# Patient Record
Sex: MALE | Race: OTHER | URBAN - METROPOLITAN AREA
[De-identification: names, ages, dates, MRNs, and addresses within clinical notes are randomized per-mention and may not be internally consistent; named-entity substitution may affect disease eponyms.]

---
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
## 2023-02-28 ENCOUNTER — HOSPITAL ENCOUNTER (EMERGENCY)
Age: 18
Discharge: HOME | End: 2023-02-28
Attending: EMERGENCY MEDICINE
Payer: COMMERCIAL

## 2023-02-28 ENCOUNTER — APPOINTMENT (OUTPATIENT)
Dept: CT IMAGING | Age: 18
End: 2023-02-28
Payer: COMMERCIAL

## 2023-02-28 VITALS
HEART RATE: 63 BPM | OXYGEN SATURATION: 100 % | TEMPERATURE: 98.2 F | BODY MASS INDEX: 25.84 KG/M2 | SYSTOLIC BLOOD PRESSURE: 150 MMHG | WEIGHT: 195 LBS | HEIGHT: 73 IN | RESPIRATION RATE: 18 BRPM | DIASTOLIC BLOOD PRESSURE: 91 MMHG

## 2023-02-28 DIAGNOSIS — D72.829 LEUKOCYTOSIS, UNSPECIFIED TYPE: ICD-10-CM

## 2023-02-28 DIAGNOSIS — R10.31 ABDOMINAL PAIN, RIGHT LOWER QUADRANT: Primary | ICD-10-CM

## 2023-02-28 LAB
A/G RATIO: 1.7 (ref 1.1–2.2)
ALBUMIN SERPL-MCNC: 4.4 G/DL (ref 3.4–5)
ALP BLD-CCNC: 41 U/L (ref 40–129)
ALT SERPL-CCNC: 9 U/L (ref 10–40)
ANION GAP SERPL CALCULATED.3IONS-SCNC: 11 MMOL/L (ref 3–16)
AST SERPL-CCNC: 12 U/L (ref 15–37)
BASOPHILS ABSOLUTE: 0.1 K/UL (ref 0–0.2)
BASOPHILS RELATIVE PERCENT: 0.7 %
BILIRUB SERPL-MCNC: 0.7 MG/DL (ref 0–1)
BUN BLDV-MCNC: 9 MG/DL (ref 7–20)
C-REACTIVE PROTEIN: <3 MG/L (ref 0–5.1)
CALCIUM SERPL-MCNC: 8.7 MG/DL (ref 8.3–10.6)
CHLORIDE BLD-SCNC: 98 MMOL/L (ref 99–110)
CO2: 25 MMOL/L (ref 21–32)
CREAT SERPL-MCNC: 0.7 MG/DL (ref 0.9–1.3)
EOSINOPHILS ABSOLUTE: 0.3 K/UL (ref 0–0.6)
EOSINOPHILS RELATIVE PERCENT: 1.8 %
GFR SERPL CREATININE-BSD FRML MDRD: >60 ML/MIN/{1.73_M2}
GLUCOSE BLD-MCNC: 96 MG/DL (ref 70–99)
HCT VFR BLD CALC: 44.4 % (ref 40.5–52.5)
HEMOGLOBIN: 15 G/DL (ref 13.5–17.5)
LYMPHOCYTES ABSOLUTE: 2.6 K/UL (ref 1–5.1)
LYMPHOCYTES RELATIVE PERCENT: 18 %
MCH RBC QN AUTO: 30.2 PG (ref 26–34)
MCHC RBC AUTO-ENTMCNC: 33.7 G/DL (ref 31–36)
MCV RBC AUTO: 89.7 FL (ref 80–100)
MONOCYTES ABSOLUTE: 1.1 K/UL (ref 0–1.3)
MONOCYTES RELATIVE PERCENT: 7.4 %
NEUTROPHILS ABSOLUTE: 10.3 K/UL (ref 1.7–7.7)
NEUTROPHILS RELATIVE PERCENT: 72.1 %
PDW BLD-RTO: 13.2 % (ref 12.4–15.4)
PLATELET # BLD: 317 K/UL (ref 135–450)
PMV BLD AUTO: 6.8 FL (ref 5–10.5)
POTASSIUM REFLEX MAGNESIUM: 3.9 MMOL/L (ref 3.5–5.1)
RBC # BLD: 4.95 M/UL (ref 4.2–5.9)
SODIUM BLD-SCNC: 134 MMOL/L (ref 136–145)
TOTAL PROTEIN: 7 G/DL (ref 6.4–8.2)
WBC # BLD: 14.3 K/UL (ref 4–11)

## 2023-02-28 PROCEDURE — 86140 C-REACTIVE PROTEIN: CPT

## 2023-02-28 PROCEDURE — 80053 COMPREHEN METABOLIC PANEL: CPT

## 2023-02-28 PROCEDURE — 96375 TX/PRO/DX INJ NEW DRUG ADDON: CPT

## 2023-02-28 PROCEDURE — 74177 CT ABD & PELVIS W/CONTRAST: CPT

## 2023-02-28 PROCEDURE — 6370000000 HC RX 637 (ALT 250 FOR IP): Performed by: EMERGENCY MEDICINE

## 2023-02-28 PROCEDURE — 96374 THER/PROPH/DIAG INJ IV PUSH: CPT

## 2023-02-28 PROCEDURE — 6360000004 HC RX CONTRAST MEDICATION: Performed by: EMERGENCY MEDICINE

## 2023-02-28 PROCEDURE — 6360000002 HC RX W HCPCS: Performed by: EMERGENCY MEDICINE

## 2023-02-28 PROCEDURE — 99285 EMERGENCY DEPT VISIT HI MDM: CPT

## 2023-02-28 PROCEDURE — 85025 COMPLETE CBC W/AUTO DIFF WBC: CPT

## 2023-02-28 RX ORDER — CIPROFLOXACIN 500 MG/1
500 TABLET, FILM COATED ORAL ONCE
Status: COMPLETED | OUTPATIENT
Start: 2023-02-28 | End: 2023-02-28

## 2023-02-28 RX ORDER — METRONIDAZOLE 250 MG/1
500 TABLET ORAL ONCE
Status: COMPLETED | OUTPATIENT
Start: 2023-02-28 | End: 2023-02-28

## 2023-02-28 RX ORDER — ONDANSETRON 4 MG/1
4 TABLET, FILM COATED ORAL EVERY 8 HOURS PRN
Qty: 9 TABLET | Refills: 0 | Status: SHIPPED | OUTPATIENT
Start: 2023-02-28 | End: 2023-03-03

## 2023-02-28 RX ORDER — HYDROCODONE BITARTRATE AND ACETAMINOPHEN 5; 325 MG/1; MG/1
1 TABLET ORAL ONCE
Status: COMPLETED | OUTPATIENT
Start: 2023-02-28 | End: 2023-02-28

## 2023-02-28 RX ORDER — ONDANSETRON 2 MG/ML
4 INJECTION INTRAMUSCULAR; INTRAVENOUS ONCE
Status: COMPLETED | OUTPATIENT
Start: 2023-02-28 | End: 2023-02-28

## 2023-02-28 RX ORDER — MORPHINE SULFATE 4 MG/ML
4 INJECTION, SOLUTION INTRAMUSCULAR; INTRAVENOUS ONCE
Status: COMPLETED | OUTPATIENT
Start: 2023-02-28 | End: 2023-02-28

## 2023-02-28 RX ORDER — METRONIDAZOLE 500 MG/1
500 TABLET ORAL 2 TIMES DAILY
Qty: 14 TABLET | Refills: 0 | Status: SHIPPED | OUTPATIENT
Start: 2023-02-28 | End: 2023-03-07

## 2023-02-28 RX ORDER — KETOROLAC TROMETHAMINE 30 MG/ML
30 INJECTION, SOLUTION INTRAMUSCULAR; INTRAVENOUS ONCE
Status: COMPLETED | OUTPATIENT
Start: 2023-02-28 | End: 2023-02-28

## 2023-02-28 RX ORDER — CIPROFLOXACIN 500 MG/1
500 TABLET, FILM COATED ORAL 2 TIMES DAILY
Qty: 14 TABLET | Refills: 0 | Status: SHIPPED | OUTPATIENT
Start: 2023-02-28 | End: 2023-03-07

## 2023-02-28 RX ORDER — HYDROCODONE BITARTRATE AND ACETAMINOPHEN 5; 325 MG/1; MG/1
1 TABLET ORAL EVERY 6 HOURS PRN
Qty: 10 TABLET | Refills: 0 | Status: SHIPPED | OUTPATIENT
Start: 2023-02-28 | End: 2023-03-03

## 2023-02-28 RX ADMIN — IOPAMIDOL 75 ML: 755 INJECTION, SOLUTION INTRAVENOUS at 04:07

## 2023-02-28 RX ADMIN — ONDANSETRON 4 MG: 2 INJECTION INTRAMUSCULAR; INTRAVENOUS at 04:41

## 2023-02-28 RX ADMIN — HYDROCODONE BITARTRATE AND ACETAMINOPHEN 1 TABLET: 5; 325 TABLET ORAL at 06:49

## 2023-02-28 RX ADMIN — MORPHINE SULFATE 4 MG: 4 INJECTION, SOLUTION INTRAMUSCULAR; INTRAVENOUS at 04:42

## 2023-02-28 RX ADMIN — CIPROFLOXACIN 500 MG: 500 TABLET, FILM COATED ORAL at 06:49

## 2023-02-28 RX ADMIN — METRONIDAZOLE 500 MG: 250 TABLET ORAL at 06:49

## 2023-02-28 RX ADMIN — KETOROLAC TROMETHAMINE 30 MG: 30 INJECTION, SOLUTION INTRAMUSCULAR; INTRAVENOUS at 03:09

## 2023-02-28 ASSESSMENT — PAIN SCALES - GENERAL
PAINLEVEL_OUTOF10: 4
PAINLEVEL_OUTOF10: 3
PAINLEVEL_OUTOF10: 9
PAINLEVEL_OUTOF10: 4
PAINLEVEL_OUTOF10: 7
PAINLEVEL_OUTOF10: 7

## 2023-02-28 ASSESSMENT — ENCOUNTER SYMPTOMS
VOMITING: 0
ABDOMINAL PAIN: 1
SHORTNESS OF BREATH: 0
COUGH: 0
BLOOD IN STOOL: 0
NAUSEA: 0

## 2023-02-28 ASSESSMENT — PAIN DESCRIPTION - ORIENTATION
ORIENTATION: RIGHT;UPPER
ORIENTATION: UPPER;RIGHT

## 2023-02-28 ASSESSMENT — PAIN DESCRIPTION - LOCATION
LOCATION: ABDOMEN
LOCATION: ABDOMEN

## 2023-02-28 ASSESSMENT — PAIN DESCRIPTION - DESCRIPTORS
DESCRIPTORS: ACHING;STABBING
DESCRIPTORS: ACHING;STABBING

## 2023-02-28 ASSESSMENT — PAIN - FUNCTIONAL ASSESSMENT: PAIN_FUNCTIONAL_ASSESSMENT: 0-10

## 2023-02-28 NOTE — Clinical Note
Sandeep Elias was seen and treated in our emergency department on 2/28/2023. He may return to work on 03/02/2023. If you have any questions or concerns, please don't hesitate to call.       [address] [facility], DO

## 2023-02-28 NOTE — ED NOTES
Paged Gen surg @ 0600  RE:ilius, RLQ abdominal pain Per Ang Mac DO  Second message sent to Dr. Rose Marie Maria @ [phone]  Dr. Rose Marie Maria responded @ [id number] and spoke with Dr. Fauzia Jj  02/28/23 5268

## 2023-02-28 NOTE — ED PROVIDER NOTES
Emergency Department Attending Physician Note  Location: [address] [place] [address]  ED  2/28/2023       Pt Name: Gerald Mendoza  MRN: [id number]  [place] 2005    Date of evaluation: 2/28/2023  Provider: Kerwin Koo DO  PCP: Temo Soto DO    Note Started: 3:12 AM EST 2/28/23    CHIEF COMPLAINT  Chief Complaint   Patient presents with    Abdominal Pain     Started at 2030, no emesis or loose stools        HISTORY OF PRESENT ILLNESS:  History obtained by patient. Limitations to history : None. Gerald Mendoza is a 21 y.o. male with no significant past medical history, no previous abdominal surgeries, presenting emerged department today with periumbilical abdominal pain that started yesterday, but now radiates down to her the right side, and is much more severe. Started around midnight last night, arrives emergency department 3 hours later, with significant pain. Patient is sitting in the ER with legs drawn up, and walking in the car ride made the pain much worse. Has not got anything for pain prior to arrival.  Had a good appetite yesterday, normal p.o. Had some chicken Parmesan for dinner. No nausea or vomiting. No recent diarrhea. Has never felt anything like this before. No back pain, but does somewhat intermittently feel the right lower quadrant pain in the right lower back. No injuries. Denies any difficulty ambulating. No other concerns including fevers. No dysuria or hematuria. Nursing Notes were all reviewed and agreed with or any disagreements were addressed in the HPI. MEDICAL HISTORY  No past medical history on file. SURGICAL HISTORY  No past surgical history on file. CURRENT MEDICATIONS  Previous Medications    No medications on file       ALLERGIES  Patient has no known allergies. FAMILYHISTORY  No family history on file.     SOCIAL HISTORY       SCREENINGS    [place] Coma Scale  Eye Opening: Spontaneous  Best Verbal Response: Oriented  Best Motor Response: Obeys commands  Jean Coma Scale Score: 15       CIWA Assessment  BP: (!) 150/91  Heart Rate: 63             REVIEW OF SYSTEMS:    Review of Systems   Constitutional:  Negative for activity change, appetite change and fever. HENT:  Negative for congestion and postnasal drip. Respiratory:  Negative for cough and shortness of breath. Cardiovascular:  Negative for chest pain and leg swelling. Gastrointestinal:  Positive for abdominal pain. Negative for blood in stool, nausea and vomiting. Genitourinary:  Negative for dysuria and hematuria. Skin:  Negative for rash and wound. Neurological:  Negative for dizziness and light-headedness. Positives and Pertinent negatives as per HPI. PHYSICAL EXAM:     Vitals:    02/28/23 0243 02/28/23 0433 02/28/23 0552 02/28/23 0644   BP: (!) 143/96 (!) 140/99 (!) 134/97 (!) 150/91   Pulse: 59 68 57 63   Resp: 16 18 14 18   Temp: 98.2 °F (36.8 °C)      TempSrc: Oral      SpO2: 100% 100% 98% 100%   Weight: 195 lb (88.5 kg)      Height: 6' 1\" (1.854 m)          Physical Exam  Constitutional:       Appearance: Normal appearance. He is normal weight. He is ill-appearing. He is not diaphoretic. Comments: Appears uncomfortable, however interactive, conversational, pleasant, and in no acute clinical cardiopulmonary distress. HENT:      Head: Normocephalic and atraumatic. Right Ear: External ear normal.      Left Ear: External ear normal.      Nose: Nose normal.      Mouth/Throat:      Mouth: Mucous membranes are moist.      Pharynx: Oropharynx is clear. Eyes:      General:         Right eye: No discharge. Left eye: No discharge. Conjunctiva/sclera: Conjunctivae normal.   Cardiovascular:      Rate and Rhythm: Normal rate and regular rhythm. Pulmonary:      Effort: Pulmonary effort is normal. No respiratory distress. Breath sounds: Normal breath sounds. Abdominal:      General: Abdomen is flat. Palpations: Abdomen is soft. Tenderness: There is abdominal tenderness (patient grabs for my hand with palpation of RLQ) in the right lower quadrant and periumbilical area. There is guarding. There is no rebound. Musculoskeletal:         General: No swelling or tenderness. Cervical back: Normal range of motion and neck supple. Skin:     General: Skin is warm and dry. Findings: No rash. Neurological:      General: No focal deficit present. Mental Status: He is alert and oriented to person, place, and time. Psychiatric:         Mood and Affect: Mood normal.         Thought Content: Thought content normal.       DIAGNOSTIC RESULTS:  LABS:    Labs Reviewed   CBC WITH AUTO DIFFERENTIAL - Abnormal; Notable for the following components:       Result Value    WBC 14.3 (*)     Neutrophils Absolute 10.3 (*)     All other components within normal limits   COMPREHENSIVE METABOLIC PANEL W/ REFLEX TO MG FOR LOW K - Abnormal; Notable for the following components:    Sodium 134 (*)     Chloride 98 (*)     Creatinine 0.7 (*)     ALT 9 (*)     AST 12 (*)     All other components within normal limits   C-REACTIVE PROTEIN   URINALYSIS WITH REFLEX TO CULTURE       When ordered, only abnormal lab results are displayed. All other labs were within normal range or not returned as of this dictation. RADIOLOGY:      Non-plain film images such as CT, Ultrasound and MRI are read by the radiologist. Interpretation per the Radiologist below, if available at the time of this note:  CT ABDOMEN PELVIS W IV CONTRAST Additional Contrast? None   Preliminary Result   1. Fecalization of the distal ileal small bowel contents. This could be   related to an infectious/inflammatory enteritis, ileus versus or early bowel   obstruction. 2. The appendix is normal.             PROCEDURES:  Unless otherwise noted below, none.     Procedures      MEDICATIONS:   Medications   ciprofloxacin (CIPRO) tablet 500 mg (has no administration in time range) metroNIDAZOLE (FLAGYL) tablet 500 mg (has no administration in time range)   HYDROcodone-acetaminophen (NORCO) 5-325 MG per tablet 1 tablet (has no administration in time range)   ketorolac (TORADOL) injection 30 mg (30 mg IntraVENous Given 2/28/23 0309)   iopamidol (ISOVUE-370) 76 % injection 75 mL (75 mLs IntraVENous Given 2/28/23 0407)   morphine sulfate (PF) injection 4 mg (4 mg IntraVENous Given 2/28/23 0442)   ondansetron (ZOFRAN) injection 4 mg (4 mg IntraVENous Given 2/28/23 0441)     _____________________________________    MEDICAL DECISION MAKING:     Patient is a 21 y.o. male with no significant past medical history, the presents emergency department today with initially periumbilical abdominal pain that has now gone to the right lower quadrant, sitting with legs drawn up in the emergency department. Has right lower quadrant tenderness to palpation with some degree of guarding. Vital signs stable. Diet is made n.p.o., given Toradol for initial management, and appendicitis work-up pending. 3:15 AM EST    Laboratory evaluation today reveals a leukocytosis of 14.3, no significant anemia. AST, ALT, bilirubin, otherwise unremarkable. CRP very reassuring. CT abdomen pelvis shows fecalization of the distal ileal small bowel, related to infectious or inflammatory enteritis, ileus versus early bowel obstruction. 5:50 AM EST  On reevaluation, patient's pain is improved with morphine. However, what concerns me is as this patient had quite uncomfortable right lower quadrant exam with some guarding, and with findings on CT, I would like to discuss this case with general surgery. 6:46 AM EST  Discussed case with Dr. Latha Puentes, who agrees with discharge home, antibiotics, pain control, and strict return precautions. Follow-up with PCP soon as possible.   I did discuss with patient any signs of decreased frequency of bowel movements, worsening pain, any nausea or vomiting, or pain that does not improve in the next 12 to 24 hours, he should return to the emergency department. Mother agrees with this plan. Discharged home in stable condition. CONSULTS:   [address]      Is this patient to be included in the SEP-1 Core Measure due to severe sepsis or septic shock? No   Exclusion criteria - the patient is NOT to be included for SEP-1 Core Measure due to: Infection is not suspected      CLINICAL IMPRESSION:     ICD-10-CM    1. Abdominal pain, right lower quadrant  R10.31 HYDROcodone-acetaminophen (NORCO) 5-325 MG per tablet      2. Leukocytosis, unspecified type  D72.829 HYDROcodone-acetaminophen (NORCO) 5-325 MG per tablet            DISPOSITION:  I discussed the results, including any incidental findings, with patient and mother and through shared decision making. Disposition today from the ED will be: Discharge to home in fair condition. Questions answered. They are agreeable to plan and express understanding of plan. Social Determinants : None        DISCHARGE MEDICATIONS (if applicable):  New Prescriptions    CIPROFLOXACIN (CIPRO) 500 MG TABLET    Take 1 tablet by mouth 2 times daily for 7 days    HYDROCODONE-ACETAMINOPHEN (NORCO) 5-325 MG PER TABLET    Take 1 tablet by mouth every 6 hours as needed for Pain for up to 3 days. Intended supply: 3 days.  Take lowest dose possible to manage pain Max Daily Amount: 4 tablets    METRONIDAZOLE (FLAGYL) 500 MG TABLET    Take 1 tablet by mouth 2 times daily for 7 days    ONDANSETRON (ZOFRAN) 4 MG TABLET    Take 1 tablet by mouth every 8 hours as needed for Nausea or Vomiting       DISCONTINUED MEDICATIONS:  Discontinued Medications    No medications on file            DISPOSITION REFERRAL (if applicable):  [facility]  ED  [address] [facility] [address] [facility]    If symptoms worsen, As needed    [place] [address]  [phone]    Schedule an appointment as soon as possible for a visit     _____________________________________      Madiha Perez DO, ([facility]) am the primary attending of record and contributed the majority of evaluation and treatment of emergent care for this encounter. This chart was generated in part by using Dragon Dictation system and may contain errors related to that system including errors in grammar, punctuation, and spelling, as well as words and phrases that may be inappropriate. If there are any questions or concerns please feel free to contact the dictating provider for clarification.      ORACIO PEREZ DO (electronically signed)   [address] [facility]  02/28/23 2965

## 2023-02-28 NOTE — ED NOTES
Patient discharged with all belongings, discharge paperwork and prescriptions x 3 sent to [facility]. Patient verbalized understanding of discharge instructions and follow up with PCP. Patient ambulatory out of ED with his mother.        Marshall Bonilla RN  02/28/23 1087